# Patient Record
Sex: FEMALE | Race: WHITE | NOT HISPANIC OR LATINO | Employment: FULL TIME | URBAN - METROPOLITAN AREA
[De-identification: names, ages, dates, MRNs, and addresses within clinical notes are randomized per-mention and may not be internally consistent; named-entity substitution may affect disease eponyms.]

---

## 2018-02-16 ENCOUNTER — HOSPITAL ENCOUNTER (OUTPATIENT)
Dept: CT IMAGING | Facility: HOSPITAL | Age: 58
Discharge: HOME | End: 2018-02-16
Attending: SURGERY | Admitting: SURGERY

## 2018-02-16 LAB — CREAT BLDA-MCNC: 0.6 MG/DL (ref 0.6–1.3)

## 2018-05-14 ENCOUNTER — HOSPITAL ENCOUNTER (OUTPATIENT)
Dept: LAB | Facility: HOSPITAL | Age: 58
Setting detail: SPECIMEN
Discharge: HOME | End: 2018-05-14
Attending: INTERNAL MEDICINE | Admitting: INTERNAL MEDICINE

## 2018-05-14 LAB
ALBUMIN SERPL-MCNC: 4.4 G/DL (ref 3.5–4.8)
ALBUMIN/GLOB SERPL: 1.6 {RATIO} (ref 1–1.7)
ALP SERPL-CCNC: 77 IU/L (ref 32–91)
ALT SERPL-CCNC: 45 IU/L (ref 14–54)
ANION GAP SERPL CALC-SCNC: 16.1 MMOL/L (ref 10–20)
AST SERPL-CCNC: 38 IU/L (ref 15–41)
BILIRUB SERPL-MCNC: 0.6 MG/DL (ref 0.3–1.2)
BUN SERPL-MCNC: 16 MG/DL (ref 8–20)
BUN/CREAT SERPL: 22.9 (ref 5.4–26.2)
CALCIUM SERPL-MCNC: 9.7 MG/DL (ref 8.9–10.3)
CHLORIDE SERPL-SCNC: 104 MMOL/L (ref 101–111)
CHOLEST SERPL-MCNC: 149 MG/DL
CHOLEST/HDLC SERPL: 3.1 {RATIO}
CONV CO2: 25 MMOL/L (ref 22–32)
CONV LDL CHOLESTEROL DIRECT: 74 MG/DL (ref 0–100)
CONV MICROALBUM.,U,RANDOM: 8 MG/L
CONV TOTAL PROTEIN: 7.1 G/DL (ref 6.1–7.9)
CREAT 24H UR-MCNC: 78.2 MG/DL
CREAT UR-MCNC: 0.7 MG/DL (ref 0.4–1)
GLOBULIN UR ELPH-MCNC: 2.7 G/DL (ref 2.5–3.8)
GLUCOSE SERPL-MCNC: 146 MG/DL (ref 65–99)
HDLC SERPL-MCNC: 48 MG/DL
LDLC/HDLC SERPL: 1.5 {RATIO}
LIPID INTERPRETATION: ABNORMAL
MICROALBUMIN/CREAT UR: 10.2 UG/MG
POTASSIUM SERPL-SCNC: 4.1 MMOL/L (ref 3.6–5.1)
SODIUM SERPL-SCNC: 141 MMOL/L (ref 136–144)
TRIGL SERPL-MCNC: 174 MG/DL
VLDLC SERPL CALC-MCNC: 26.4 MG/DL

## 2018-06-13 ENCOUNTER — HOSPITAL ENCOUNTER (OUTPATIENT)
Dept: PREADMISSION TESTING | Facility: HOSPITAL | Age: 58
Discharge: HOME | End: 2018-06-13
Attending: SURGERY | Admitting: SURGERY

## 2018-06-13 LAB
ANION GAP SERPL CALC-SCNC: 13.4 MMOL/L (ref 10–20)
BASOPHILS # BLD AUTO: 0 10*3/UL (ref 0–0.2)
BASOPHILS NFR BLD AUTO: 1 % (ref 0–2)
BUN SERPL-MCNC: 15 MG/DL (ref 8–20)
BUN/CREAT SERPL: 25 (ref 5.4–26.2)
CALCIUM SERPL-MCNC: 9.3 MG/DL (ref 8.9–10.3)
CHLORIDE SERPL-SCNC: 102 MMOL/L (ref 101–111)
CONV CO2: 27 MMOL/L (ref 22–32)
CREAT UR-MCNC: 0.6 MG/DL (ref 0.4–1)
DIFFERENTIAL METHOD BLD: (no result)
EOSINOPHIL # BLD AUTO: 0.1 10*3/UL (ref 0–0.3)
EOSINOPHIL # BLD AUTO: 3 % (ref 0–3)
ERYTHROCYTE [DISTWIDTH] IN BLOOD BY AUTOMATED COUNT: 13.8 % (ref 11.5–14.5)
GLUCOSE SERPL-MCNC: 143 MG/DL (ref 65–99)
HCT VFR BLD AUTO: 41.3 % (ref 35–49)
HGB BLD-MCNC: 13.5 G/DL (ref 12–15)
LYMPHOCYTES # BLD AUTO: 1.8 10*3/UL (ref 0.8–4.8)
LYMPHOCYTES NFR BLD AUTO: 37 % (ref 18–42)
MCH RBC QN AUTO: 28.8 PG (ref 26–32)
MCHC RBC AUTO-ENTMCNC: 32.8 G/DL (ref 32–36)
MCV RBC AUTO: 87.8 FL (ref 80–94)
MONOCYTES # BLD AUTO: 0.3 10*3/UL (ref 0.1–1.3)
MONOCYTES NFR BLD AUTO: 6 % (ref 2–11)
NEUTROPHILS # BLD AUTO: 2.7 10*3/UL (ref 2.3–8.6)
NEUTROPHILS NFR BLD AUTO: 53 % (ref 50–75)
NRBC BLD AUTO-RTO: 0 /100{WBCS}
NRBC/RBC NFR BLD MANUAL: 0 10*3/UL
PLATELET # BLD AUTO: 228 10*3/UL (ref 150–450)
PMV BLD AUTO: 6.9 FL (ref 7.4–10.4)
POTASSIUM SERPL-SCNC: 4.4 MMOL/L (ref 3.6–5.1)
RBC # BLD AUTO: 4.71 10*6/UL (ref 4–5.4)
SODIUM SERPL-SCNC: 138 MMOL/L (ref 136–144)
WBC # BLD AUTO: 5 10*3/UL (ref 4.5–11.5)

## 2018-06-14 ENCOUNTER — HOSPITAL ENCOUNTER (OUTPATIENT)
Dept: PREOP | Facility: HOSPITAL | Age: 58
Setting detail: HOSPITAL OUTPATIENT SURGERY
Discharge: HOME | End: 2018-06-14
Attending: SURGERY | Admitting: SURGERY

## 2018-06-14 LAB — GLUCOSE BLD-MCNC: 123 MG/DL (ref 70–105)

## 2018-07-16 ENCOUNTER — HOSPITAL ENCOUNTER (OUTPATIENT)
Dept: LAB | Facility: HOSPITAL | Age: 58
Setting detail: SPECIMEN
Discharge: HOME | End: 2018-07-16
Attending: INTERNAL MEDICINE | Admitting: INTERNAL MEDICINE

## 2018-07-16 LAB
ALBUMIN SERPL-MCNC: 4.1 G/DL (ref 3.5–4.8)
ALBUMIN/GLOB SERPL: 1.3 {RATIO} (ref 1–1.7)
ALP SERPL-CCNC: 88 IU/L (ref 32–91)
ALT SERPL-CCNC: 34 IU/L (ref 14–54)
ANION GAP SERPL CALC-SCNC: 14.1 MMOL/L (ref 10–20)
AST SERPL-CCNC: 29 IU/L (ref 15–41)
BILIRUB SERPL-MCNC: 0.7 MG/DL (ref 0.3–1.2)
BUN SERPL-MCNC: 15 MG/DL (ref 8–20)
BUN/CREAT SERPL: 21.4 (ref 5.4–26.2)
CALCIUM SERPL-MCNC: 9.5 MG/DL (ref 8.9–10.3)
CHLORIDE SERPL-SCNC: 100 MMOL/L (ref 101–111)
CONV CO2: 27 MMOL/L (ref 22–32)
CONV TOTAL PROTEIN: 7.3 G/DL (ref 6.1–7.9)
CREAT UR-MCNC: 0.7 MG/DL (ref 0.4–1)
GLOBULIN UR ELPH-MCNC: 3.2 G/DL (ref 2.5–3.8)
GLUCOSE SERPL-MCNC: 201 MG/DL (ref 65–99)
POTASSIUM SERPL-SCNC: 4.1 MMOL/L (ref 3.6–5.1)
SODIUM SERPL-SCNC: 137 MMOL/L (ref 136–144)

## 2019-06-18 RX ORDER — INSULIN GLARGINE 100 [IU]/ML
INJECTION, SOLUTION SUBCUTANEOUS
Qty: 45 ML | Refills: 3 | Status: SHIPPED | OUTPATIENT
Start: 2019-06-18

## 2019-06-24 RX ORDER — SITAGLIPTIN AND METFORMIN HYDROCHLORIDE 1000; 50 MG/1; MG/1
TABLET, FILM COATED, EXTENDED RELEASE ORAL
Qty: 60 TABLET | Refills: 1 | Status: SHIPPED | OUTPATIENT
Start: 2019-06-24 | End: 2019-09-02 | Stop reason: SDUPTHER

## 2019-07-30 PROBLEM — M54.89 OTHER DORSALGIA: Status: ACTIVE | Noted: 2018-02-05

## 2019-07-30 PROBLEM — F32.9 MAJOR DEPRESSIVE DISORDER, SINGLE EPISODE: Status: ACTIVE | Noted: 2018-02-05

## 2019-07-30 PROBLEM — E66.9 OBESITY: Status: ACTIVE | Noted: 2018-05-14

## 2019-07-30 PROBLEM — E11.65 TYPE 2 DIABETES MELLITUS WITH HYPERGLYCEMIA (HCC): Status: ACTIVE | Noted: 2018-02-05

## 2019-07-30 PROBLEM — G47.33 OBSTRUCTIVE SLEEP APNEA: Status: ACTIVE | Noted: 2018-02-05

## 2019-07-30 PROBLEM — E78.5 HYPERLIPIDEMIA: Status: ACTIVE | Noted: 2018-02-05

## 2019-07-30 PROBLEM — E53.8 VITAMIN B12 DEFICIENCY: Status: ACTIVE | Noted: 2018-07-31

## 2019-07-30 PROBLEM — R42 DIZZINESS: Status: ACTIVE | Noted: 2019-05-07

## 2019-07-30 PROBLEM — I10 HYPERTENSION, BENIGN: Status: ACTIVE | Noted: 2018-02-05

## 2019-07-30 PROBLEM — K21.9 GASTROESOPHAGEAL REFLUX DISEASE: Status: ACTIVE | Noted: 2018-02-05

## 2019-07-30 PROBLEM — E55.9 VITAMIN D DEFICIENCY: Status: ACTIVE | Noted: 2018-07-31

## 2019-07-30 PROBLEM — K95.09 OTHER COMPLICATIONS OF GASTRIC BAND PROCEDURE: Status: ACTIVE | Noted: 2018-02-05

## 2019-07-30 PROBLEM — R10.11 RIGHT UPPER QUADRANT PAIN: Status: ACTIVE | Noted: 2018-02-05

## 2019-07-30 PROBLEM — E11.42 DIABETIC PERIPHERAL NEUROPATHY (HCC): Status: ACTIVE | Noted: 2018-05-14

## 2019-07-30 PROBLEM — K76.0 STEATOSIS OF LIVER: Status: ACTIVE | Noted: 2018-02-05

## 2019-07-30 RX ORDER — BLOOD-GLUCOSE METER
EACH MISCELLANEOUS
COMMUNITY
Start: 2018-10-30

## 2019-07-30 RX ORDER — FLUCONAZOLE 150 MG/1
TABLET ORAL
COMMUNITY
Start: 2018-05-14

## 2019-07-30 RX ORDER — MAGNESIUM 200 MG
TABLET ORAL EVERY 24 HOURS
COMMUNITY
Start: 2018-02-06

## 2019-07-30 RX ORDER — VALSARTAN 40 MG/1
TABLET ORAL EVERY 24 HOURS
COMMUNITY
Start: 2019-05-07

## 2019-07-30 RX ORDER — VENLAFAXINE HYDROCHLORIDE 75 MG/1
1 CAPSULE, EXTENDED RELEASE ORAL EVERY 24 HOURS
COMMUNITY
Start: 2018-02-06

## 2019-07-30 RX ORDER — GLIMEPIRIDE 2 MG/1
TABLET ORAL
COMMUNITY
Start: 2019-03-09 | End: 2019-09-02 | Stop reason: SDUPTHER

## 2019-07-30 RX ORDER — TRIAMCINOLONE ACETONIDE 55 UG/1
SPRAY, METERED NASAL
COMMUNITY
Start: 2019-02-07

## 2019-07-30 RX ORDER — NAPROXEN SODIUM 220 MG
TABLET ORAL
COMMUNITY
Start: 2018-05-14

## 2019-08-06 RX ORDER — SULFAMETHOXAZOLE AND TRIMETHOPRIM 800; 160 MG/1; MG/1
TABLET ORAL
COMMUNITY
Start: 2019-07-25

## 2019-08-06 RX ORDER — ARIPIPRAZOLE 2 MG/1
TABLET ORAL
COMMUNITY
Start: 2019-07-24

## 2019-09-05 RX ORDER — SITAGLIPTIN AND METFORMIN HYDROCHLORIDE 1000; 50 MG/1; MG/1
TABLET, FILM COATED, EXTENDED RELEASE ORAL
Qty: 60 TABLET | Refills: 2 | Status: SHIPPED | OUTPATIENT
Start: 2019-09-05

## 2019-09-05 RX ORDER — GLIMEPIRIDE 2 MG/1
TABLET ORAL
Qty: 60 TABLET | Refills: 2 | Status: SHIPPED | OUTPATIENT
Start: 2019-09-05

## 2019-10-08 ENCOUNTER — OFFICE VISIT (OUTPATIENT)
Dept: NEUROLOGY | Facility: CLINIC | Age: 59
End: 2019-10-08

## 2019-10-08 VITALS
BODY MASS INDEX: 39.07 KG/M2 | SYSTOLIC BLOOD PRESSURE: 187 MMHG | DIASTOLIC BLOOD PRESSURE: 81 MMHG | WEIGHT: 199 LBS | HEART RATE: 82 BPM | HEIGHT: 60 IN

## 2019-10-08 DIAGNOSIS — G47.33 OBSTRUCTIVE SLEEP APNEA: ICD-10-CM

## 2019-10-08 DIAGNOSIS — G43.109 MIGRAINE AURA WITHOUT HEADACHE: ICD-10-CM

## 2019-10-08 DIAGNOSIS — R42 DIZZINESS: Primary | ICD-10-CM

## 2019-10-08 DIAGNOSIS — E11.42 DIABETIC PERIPHERAL NEUROPATHY (HCC): ICD-10-CM

## 2019-10-08 PROCEDURE — 99215 OFFICE O/P EST HI 40 MIN: CPT | Performed by: PSYCHIATRY & NEUROLOGY

## 2019-10-08 NOTE — PROGRESS NOTES
Subjective:     Patient ID: Addie Maldonado is a 58 y.o. female.     Chief complaint:  Dizziness,    New Neuro patient referred by Anabel Agudelo MD,      Onset of dizziness for a year.   Patient dizziness on and off all day long with balance issues  This is not vertigo or spinning, occurs when standing only.  When walking tends to veer to the left. Hs not fallen. Nothing seems to make it worse or better.  Not a light headed sensation.     Patient has neuropathy with numbness and tingling in toes. No pain at this time but has had some pain at times.  Has taken gabapentin in the past.    Recently had tingling sensation in the right side of back of head, not in past several weeks.  Numbness in toes, not entire feet. A little in finger tips.      Patient gets migraines on oc with lighting bolts and see's spots. Not associated with headache.     Patient diagnosed with sleep apnea 10 years  occ uses CPAP machine. Feels the same if uses CPAP or not. Reported to be severe 60 times per hour. Used to benefit from CPAP.  Has gained weight, obtained new machine , 5 yr ago, did not repeat sleep test.   c/o fatigue all the time even with using her CPAP machine.  Sleep study was at [facility] about 10 years ago, [facility]. Patient has weight gain over the past 5 years about 40 pounds and has a hard time staying awake at work.      The following portions of the patient's history were reviewed and updated as appropriate: allergies, current medications, past family history, past medical history, past social history, past surgical history and problem list.      Family History   Problem Relation Age of Onset   • Diabetes Mother    • Neuropathy Father    • Dementia Paternal Grandfather        Past Medical History:   Diagnosis Date   • Anemia    • Diabetes (CMS/HCC)    • Hyperlipidemia    • Hypertension    • Migraine    • Sleep apnea        Social History     Socioeconomic History   • Marital status: Single      Spouse name: Not on file   • Number of children: Not on file   • Years of education: Not on file   • Highest education level: Not on file   Tobacco Use   • Smoking status: Former Smoker   • Smokeless tobacco: Never Used   Substance and Sexual Activity   • Alcohol use: No     Frequency: Never   • Drug use: No   • Sexual activity: Defer         Current Outpatient Medications:   •  ARIPiprazole (ABILIFY) 2 MG tablet, , Disp: , Rfl:   •  Blood Glucose Monitoring Suppl (ACCU-CHEK GUIDE) w/Device kit, ACCU-CHEK GUIDE w/Device KIT, Disp: , Rfl:   •  Cholecalciferol (HM VITAMIN D3) 4000 units capsule, HM VITAMIN D3 4000 UNIT CAPS, Disp: , Rfl:   •  Cyanocobalamin (B-12) 1000 MCG sublingual tablet, Daily., Disp: , Rfl:   •  fluconazole (DIFLUCAN) 150 MG tablet, FLUCONAZOLE 150 MG TABS, Disp: , Rfl:   •  glimepiride (AMARYL) 2 MG tablet, TAKE ONE TABLET BY MOUTH WITH BREAKFAST AND SUPPER, Disp: 60 tablet, Rfl: 2  •  glucose blood (ACCU-CHEK GUIDE) test strip, ACCU-CHEK GUIDE STRP, Disp: , Rfl:   •  Insulin Glargine (BASAGLAR KWIKPEN) 100 UNIT/ML injection pen, INJECT 40 UNITS UNDER THE SKIN AT BEDTIME, Disp: 45 mL, Rfl: 3  •  JANUMET XR  MG tablet, TAKE ONE TABLET BY MOUTH TWICE A DAY, Disp: 60 tablet, Rfl: 2  •  naproxen sodium (ALEVE) 220 MG tablet, ALEVE 220 MG TABS, Disp: , Rfl:   •  sulfamethoxazole-trimethoprim (BACTRIM DS,SEPTRA DS) 800-160 MG per tablet, , Disp: , Rfl:   •  Triamcinolone Acetonide (NASACORT ALLERGY 24HR) 55 MCG/ACT nasal inhaler, NASACORT, Disp: , Rfl:   •  valsartan (DIOVAN) 40 MG tablet, Daily., Disp: , Rfl:   •  venlafaxine XR (EFFEXOR XR) 75 MG 24 hr capsule, 1 capsule Daily., Disp: , Rfl:     Review of Systems   Constitutional: Positive for fatigue. Negative for appetite change.   HENT: Positive for sore throat. Negative for sinus pressure and sinus pain.    Eyes: Positive for visual disturbance. Negative for itching.   Respiratory: Positive for shortness of breath. Negative for cough.     Cardiovascular: Negative for chest pain and palpitations.   Gastrointestinal: Negative for constipation and diarrhea.   Endocrine: Negative for cold intolerance and heat intolerance.   Genitourinary: Positive for frequency. Negative for difficulty urinating.   Musculoskeletal: Positive for back pain and neck pain.   Allergic/Immunologic: Positive for environmental allergies.   Neurological: Positive for dizziness, numbness and headaches. Negative for tremors, seizures, syncope, facial asymmetry, speech difficulty, weakness and light-headedness.   Psychiatric/Behavioral: Negative for agitation and confusion.        I have reviewed ROS completed by medical assistant.     Objective:    Neurologic Exam     Mental Status   Oriented to person, place, and time.   Attention: normal. Concentration: normal.   Level of consciousness: alert    Cranial Nerves   Cranial nerves II through XII intact.     CN III, IV, VI   Pupils are equal, round, and reactive to light.  Extraocular motions are normal.     Motor Exam   Muscle bulk: normal  Overall muscle tone: normal  Right arm pronator drift: absent  Right leg tone: normal  Left leg tone: normal    Strength   Strength 5/5 throughout.     Sensory Exam   Right leg light touch: decreased from toes  Left leg light touch: decreased from toes  Right leg vibration: decreased from toes  Left leg vibration: decreased from toes  Right leg pinprick: decreased from toes  Left leg pinprick: decreased from toes    Gait, Coordination, and Reflexes     Gait  Gait: normal    Coordination   Romberg: negative  Finger to nose coordination: normal  Heel to shin coordination: normal  Tandem walking coordination: abnormal    Tremor   Resting tremor: absent  Intention tremor: absent  Action tremor: absent    Reflexes   Right biceps: 2+  Left biceps: 2+  Right patellar: 2+  Left patellar: 2+  Right achilles: 1+  Left achilles: 1+  Right plantar: normal  Left plantar: normal      Physical Exam    Constitutional: She is oriented to person, place, and time. She appears well-developed and well-nourished.   HENT:   Head: Normocephalic.   Eyes: Conjunctivae and EOM are normal. Pupils are equal, round, and reactive to light.   Cardiovascular: Normal rate, regular rhythm and normal heart sounds.   Pulmonary/Chest: Effort normal and breath sounds normal.   Musculoskeletal: Normal range of motion. She exhibits no edema or deformity.   Neurological: She is alert and oriented to person, place, and time. She has normal strength. She has an abnormal Tandem Gait Test. She has a normal Finger-Nose-Finger Test, a normal Heel to Frankel Test and a normal Romberg Test. Gait normal.   Reflex Scores:       Bicep reflexes are 2+ on the right side and 2+ on the left side.       Patellar reflexes are 2+ on the right side and 2+ on the left side.       Achilles reflexes are 1+ on the right side and 1+ on the left side.  Psychiatric: She has a normal mood and affect. Her behavior is normal.   Vitals reviewed.      Assessment/Plan:    Addie was seen today for dizziness.    Diagnoses and all orders for this visit:    Dizziness    Diabetic peripheral neuropathy (CMS/HCC)    Obstructive sleep apnea    Migraine aura without headache    1.  Pt do dizziness, but not vertigo, off balance feeling, this may be due to the diabetic neuropathy, or could be due to cerebellar dysfunction or both  /will obtain MRI brain and EMG     2.  Neuropathy, probably due due diabetes, this may be contributing to the difficulty with balance. The tingling on the back of the head may have been a mononeuropathy from diabetes which has now resolved.   / will obtain EMG and labs..    3. Migraine aura wo headache.     4. 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   / will obtain NPSG  /will request records from [facility] on the prior treatment of OLVIN    EPWORTH SLEEPINESS SCALE  Sitting and reading  3  WatchingTV  3  Sitting, inactive, in a public place  3  As a  passenger in a car for 1 hour w/o a break  3  Lying down to rest in the afternoon  3  Sitting and talking to someone  3  Sitting quietly after a lunch  3  In a car, while stopped for traffic or a light  3  Total 24          This document has been electronically signed by Joseph Seipel, MD on October 8, 2019 10:01 AM

## 2019-10-25 ENCOUNTER — APPOINTMENT (OUTPATIENT)
Dept: MRI IMAGING | Facility: HOSPITAL | Age: 59
End: 2019-10-25

## 2019-10-31 ENCOUNTER — HOSPITAL ENCOUNTER (OUTPATIENT)
Dept: SLEEP MEDICINE | Facility: HOSPITAL | Age: 59
Discharge: HOME | End: 2019-10-31
Admitting: PSYCHIATRY & NEUROLOGY

## 2019-10-31 VITALS — BODY MASS INDEX: 38.48 KG/M2 | WEIGHT: 196 LBS | HEIGHT: 60 IN

## 2019-10-31 DIAGNOSIS — G47.33 OBSTRUCTIVE SLEEP APNEA: ICD-10-CM

## 2019-10-31 PROCEDURE — 95811 POLYSOM 6/>YRS CPAP 4/> PARM: CPT

## 2019-11-04 PROCEDURE — 95811 POLYSOM 6/>YRS CPAP 4/> PARM: CPT | Performed by: PSYCHIATRY & NEUROLOGY

## 2019-11-26 ENCOUNTER — TELEPHONE (OUTPATIENT)
Dept: NEUROLOGY | Facility: CLINIC | Age: 59
End: 2019-11-26

## 2019-11-26 DIAGNOSIS — G47.33 OBSTRUCTIVE SLEEP APNEA: Primary | ICD-10-CM

## 2022-03-09 ENCOUNTER — OFFICE (OUTPATIENT)
Dept: URBAN - METROPOLITAN AREA CLINIC 64 | Facility: CLINIC | Age: 62
End: 2022-03-09

## 2022-03-09 VITALS
HEIGHT: 60 IN | HEART RATE: 104 BPM | WEIGHT: 196 LBS | DIASTOLIC BLOOD PRESSURE: 84 MMHG | SYSTOLIC BLOOD PRESSURE: 128 MMHG

## 2022-03-09 DIAGNOSIS — R10.11 RIGHT UPPER QUADRANT PAIN: ICD-10-CM

## 2022-03-09 DIAGNOSIS — K76.0 FATTY (CHANGE OF) LIVER, NOT ELSEWHERE CLASSIFIED: ICD-10-CM

## 2022-03-09 PROCEDURE — 99204 OFFICE O/P NEW MOD 45 MIN: CPT | Performed by: INTERNAL MEDICINE

## 2022-03-09 RX ORDER — PANTOPRAZOLE SODIUM 40 MG/1
40 TABLET, DELAYED RELEASE ORAL
Qty: 30 | Refills: 11 | Status: COMPLETED
Start: 2022-03-09 | End: 2022-07-11

## 2022-04-26 ENCOUNTER — ON CAMPUS - OUTPATIENT (OUTPATIENT)
Dept: URBAN - METROPOLITAN AREA HOSPITAL 77 | Facility: HOSPITAL | Age: 62
End: 2022-04-26

## 2022-04-26 DIAGNOSIS — B37.81 CANDIDAL ESOPHAGITIS: ICD-10-CM

## 2022-04-26 DIAGNOSIS — R13.10 DYSPHAGIA, UNSPECIFIED: ICD-10-CM

## 2022-04-26 DIAGNOSIS — K29.50 UNSPECIFIED CHRONIC GASTRITIS WITHOUT BLEEDING: ICD-10-CM

## 2022-04-26 DIAGNOSIS — R10.13 EPIGASTRIC PAIN: ICD-10-CM

## 2022-04-26 DIAGNOSIS — R10.11 RIGHT UPPER QUADRANT PAIN: ICD-10-CM

## 2022-04-26 PROCEDURE — 43239 EGD BIOPSY SINGLE/MULTIPLE: CPT | Performed by: INTERNAL MEDICINE

## 2022-04-26 PROCEDURE — 43450 DILATE ESOPHAGUS 1/MULT PASS: CPT | Performed by: INTERNAL MEDICINE

## 2022-07-11 ENCOUNTER — OFFICE (OUTPATIENT)
Dept: URBAN - METROPOLITAN AREA CLINIC 64 | Facility: CLINIC | Age: 62
End: 2022-07-11

## 2022-07-11 VITALS
HEART RATE: 101 BPM | SYSTOLIC BLOOD PRESSURE: 130 MMHG | WEIGHT: 188 LBS | HEIGHT: 60 IN | DIASTOLIC BLOOD PRESSURE: 89 MMHG

## 2022-07-11 DIAGNOSIS — R11.2 NAUSEA WITH VOMITING, UNSPECIFIED: ICD-10-CM

## 2022-07-11 DIAGNOSIS — K21.9 GASTRO-ESOPHAGEAL REFLUX DISEASE WITHOUT ESOPHAGITIS: ICD-10-CM

## 2022-07-11 PROCEDURE — 99214 OFFICE O/P EST MOD 30 MIN: CPT | Performed by: INTERNAL MEDICINE

## 2022-07-11 RX ORDER — PANTOPRAZOLE SODIUM 40 MG/1
40 TABLET, DELAYED RELEASE ORAL
Qty: 90 | Refills: 5 | Status: ACTIVE
Start: 2022-07-11

## 2022-11-07 ENCOUNTER — INPATIENT HOSPITAL (OUTPATIENT)
Dept: URBAN - METROPOLITAN AREA HOSPITAL 76 | Facility: HOSPITAL | Age: 62
End: 2022-11-07

## 2022-11-07 DIAGNOSIS — R07.9 CHEST PAIN, UNSPECIFIED: ICD-10-CM

## 2022-11-07 PROCEDURE — 99222 1ST HOSP IP/OBS MODERATE 55: CPT | Performed by: INTERNAL MEDICINE

## 2022-11-08 ENCOUNTER — INPATIENT HOSPITAL (OUTPATIENT)
Dept: URBAN - METROPOLITAN AREA HOSPITAL 76 | Facility: HOSPITAL | Age: 62
End: 2022-11-08

## 2022-11-08 DIAGNOSIS — R07.9 CHEST PAIN, UNSPECIFIED: ICD-10-CM

## 2022-11-08 PROCEDURE — 99232 SBSQ HOSP IP/OBS MODERATE 35: CPT

## 2024-06-25 ENCOUNTER — ON CAMPUS - OUTPATIENT (OUTPATIENT)
Dept: URBAN - METROPOLITAN AREA HOSPITAL 77 | Facility: HOSPITAL | Age: 64
End: 2024-06-25
Payer: COMMERCIAL

## 2024-06-25 DIAGNOSIS — D12.5 BENIGN NEOPLASM OF SIGMOID COLON: ICD-10-CM

## 2024-06-25 DIAGNOSIS — D12.0 BENIGN NEOPLASM OF CECUM: ICD-10-CM

## 2024-06-25 DIAGNOSIS — Z12.11 ENCOUNTER FOR SCREENING FOR MALIGNANT NEOPLASM OF COLON: ICD-10-CM

## 2024-06-25 DIAGNOSIS — K57.30 DIVERTICULOSIS OF LARGE INTESTINE WITHOUT PERFORATION OR ABS: ICD-10-CM

## 2024-06-25 DIAGNOSIS — D12.2 BENIGN NEOPLASM OF ASCENDING COLON: ICD-10-CM

## 2024-06-25 DIAGNOSIS — K62.1 RECTAL POLYP: ICD-10-CM

## 2024-06-25 DIAGNOSIS — D12.3 BENIGN NEOPLASM OF TRANSVERSE COLON: ICD-10-CM

## 2024-06-25 PROCEDURE — 45385 COLONOSCOPY W/LESION REMOVAL: CPT | Mod: 33 | Performed by: INTERNAL MEDICINE
